# Patient Record
Sex: FEMALE | Race: WHITE | NOT HISPANIC OR LATINO | Employment: FULL TIME | URBAN - METROPOLITAN AREA
[De-identification: names, ages, dates, MRNs, and addresses within clinical notes are randomized per-mention and may not be internally consistent; named-entity substitution may affect disease eponyms.]

---

## 2024-04-24 ENCOUNTER — HOSPITAL ENCOUNTER (EMERGENCY)
Facility: HOSPITAL | Age: 39
Discharge: HOME | End: 2024-04-24
Attending: EMERGENCY MEDICINE
Payer: COMMERCIAL

## 2024-04-24 VITALS
WEIGHT: 159.63 LBS | HEIGHT: 62 IN | TEMPERATURE: 98 F | OXYGEN SATURATION: 98 % | HEART RATE: 77 BPM | SYSTOLIC BLOOD PRESSURE: 109 MMHG | BODY MASS INDEX: 29.38 KG/M2 | DIASTOLIC BLOOD PRESSURE: 64 MMHG | RESPIRATION RATE: 18 BRPM

## 2024-04-24 DIAGNOSIS — M25.59 PAIN IN OTHER JOINT: ICD-10-CM

## 2024-04-24 DIAGNOSIS — M25.532 WRIST PAIN, LEFT: ICD-10-CM

## 2024-04-24 DIAGNOSIS — R07.9 CHEST PAIN: ICD-10-CM

## 2024-04-24 DIAGNOSIS — M13.0 POLYARTHRITIS: Primary | ICD-10-CM

## 2024-04-24 PROBLEM — M25.50 POLYARTHRALGIA: Status: ACTIVE | Noted: 2024-04-24

## 2024-04-24 LAB
ALBUMIN SERPL BCP-MCNC: 4.1 G/DL (ref 3.5–5.2)
ALP SERPL-CCNC: 82 U/L (ref 55–135)
ALT SERPL W/O P-5'-P-CCNC: 24 U/L (ref 10–44)
ANION GAP SERPL CALC-SCNC: 11 MMOL/L (ref 8–16)
AST SERPL-CCNC: 23 U/L (ref 10–40)
BASOPHILS # BLD AUTO: 0.05 K/UL (ref 0–0.2)
BASOPHILS NFR BLD: 0.9 % (ref 0–1.9)
BILIRUB SERPL-MCNC: 0.5 MG/DL (ref 0.1–1)
BNP SERPL-MCNC: <10 PG/ML (ref 0–99)
BUN SERPL-MCNC: 13 MG/DL (ref 6–20)
CALCIUM SERPL-MCNC: 8.9 MG/DL (ref 8.7–10.5)
CCP AB SER IA-ACNC: <0.5 U/ML
CHLORIDE SERPL-SCNC: 107 MMOL/L (ref 95–110)
CK SERPL-CCNC: 44 U/L (ref 20–180)
CO2 SERPL-SCNC: 19 MMOL/L (ref 23–29)
CREAT SERPL-MCNC: 0.6 MG/DL (ref 0.5–1.4)
CRP SERPL-MCNC: 9.5 MG/L (ref 0–8.2)
DIFFERENTIAL METHOD BLD: ABNORMAL
EOSINOPHIL # BLD AUTO: 0.1 K/UL (ref 0–0.5)
EOSINOPHIL NFR BLD: 2 % (ref 0–8)
ERYTHROCYTE [DISTWIDTH] IN BLOOD BY AUTOMATED COUNT: 12 % (ref 11.5–14.5)
ERYTHROCYTE [SEDIMENTATION RATE] IN BLOOD BY WESTERGREN METHOD: 7 MM/HR (ref 0–20)
EST. GFR  (NO RACE VARIABLE): >60 ML/MIN/1.73 M^2
GLUCOSE SERPL-MCNC: 88 MG/DL (ref 70–110)
HCT VFR BLD AUTO: 35.8 % (ref 37–48.5)
HCV AB SERPL QL IA: NEGATIVE
HEP C VIRUS HOLD SPECIMEN: NORMAL
HGB BLD-MCNC: 12.4 G/DL (ref 12–16)
HIV 1+2 AB+HIV1 P24 AG SERPL QL IA: NEGATIVE
IMM GRANULOCYTES # BLD AUTO: 0.01 K/UL (ref 0–0.04)
IMM GRANULOCYTES NFR BLD AUTO: 0.2 % (ref 0–0.5)
LYMPHOCYTES # BLD AUTO: 1.6 K/UL (ref 1–4.8)
LYMPHOCYTES NFR BLD: 29 % (ref 18–48)
MCH RBC QN AUTO: 30.7 PG (ref 27–31)
MCHC RBC AUTO-ENTMCNC: 34.6 G/DL (ref 32–36)
MCV RBC AUTO: 89 FL (ref 82–98)
MONOCYTES # BLD AUTO: 0.3 K/UL (ref 0.3–1)
MONOCYTES NFR BLD: 6 % (ref 4–15)
NEUTROPHILS # BLD AUTO: 3.4 K/UL (ref 1.8–7.7)
NEUTROPHILS NFR BLD: 61.9 % (ref 38–73)
NRBC BLD-RTO: 0 /100 WBC
OHS QRS DURATION: 70 MS
OHS QRS DURATION: 72 MS
OHS QTC CALCULATION: 423 MS
OHS QTC CALCULATION: 705 MS
PLATELET # BLD AUTO: 249 K/UL (ref 150–450)
PMV BLD AUTO: 9.9 FL (ref 9.2–12.9)
POTASSIUM SERPL-SCNC: 3.9 MMOL/L (ref 3.5–5.1)
PROT SERPL-MCNC: 6.9 G/DL (ref 6–8.4)
RBC # BLD AUTO: 4.04 M/UL (ref 4–5.4)
RHEUMATOID FACT SERPL-ACNC: <13 IU/ML (ref 0–15)
SODIUM SERPL-SCNC: 137 MMOL/L (ref 136–145)
TROPONIN I SERPL DL<=0.01 NG/ML-MCNC: <0.006 NG/ML (ref 0–0.03)
TROPONIN I SERPL DL<=0.01 NG/ML-MCNC: <0.006 NG/ML (ref 0–0.03)
WBC # BLD AUTO: 5.52 K/UL (ref 3.9–12.7)

## 2024-04-24 PROCEDURE — 86431 RHEUMATOID FACTOR QUANT: CPT | Performed by: EMERGENCY MEDICINE

## 2024-04-24 PROCEDURE — 63600175 PHARM REV CODE 636 W HCPCS: Performed by: EMERGENCY MEDICINE

## 2024-04-24 PROCEDURE — 86038 ANTINUCLEAR ANTIBODIES: CPT | Performed by: EMERGENCY MEDICINE

## 2024-04-24 PROCEDURE — 96374 THER/PROPH/DIAG INJ IV PUSH: CPT

## 2024-04-24 PROCEDURE — 83880 ASSAY OF NATRIURETIC PEPTIDE: CPT | Performed by: EMERGENCY MEDICINE

## 2024-04-24 PROCEDURE — 82550 ASSAY OF CK (CPK): CPT | Performed by: EMERGENCY MEDICINE

## 2024-04-24 PROCEDURE — 96376 TX/PRO/DX INJ SAME DRUG ADON: CPT

## 2024-04-24 PROCEDURE — 85025 COMPLETE CBC W/AUTO DIFF WBC: CPT | Performed by: EMERGENCY MEDICINE

## 2024-04-24 PROCEDURE — 36415 COLL VENOUS BLD VENIPUNCTURE: CPT | Performed by: EMERGENCY MEDICINE

## 2024-04-24 PROCEDURE — 96375 TX/PRO/DX INJ NEW DRUG ADDON: CPT

## 2024-04-24 PROCEDURE — 80053 COMPREHEN METABOLIC PANEL: CPT | Performed by: EMERGENCY MEDICINE

## 2024-04-24 PROCEDURE — 87389 HIV-1 AG W/HIV-1&-2 AB AG IA: CPT | Performed by: EMERGENCY MEDICINE

## 2024-04-24 PROCEDURE — 86140 C-REACTIVE PROTEIN: CPT | Performed by: EMERGENCY MEDICINE

## 2024-04-24 PROCEDURE — 84484 ASSAY OF TROPONIN QUANT: CPT | Mod: 91 | Performed by: EMERGENCY MEDICINE

## 2024-04-24 PROCEDURE — 93005 ELECTROCARDIOGRAM TRACING: CPT

## 2024-04-24 PROCEDURE — 86803 HEPATITIS C AB TEST: CPT | Performed by: EMERGENCY MEDICINE

## 2024-04-24 PROCEDURE — 86200 CCP ANTIBODY: CPT | Performed by: EMERGENCY MEDICINE

## 2024-04-24 PROCEDURE — 99285 EMERGENCY DEPT VISIT HI MDM: CPT | Mod: 25

## 2024-04-24 PROCEDURE — 93010 ELECTROCARDIOGRAM REPORT: CPT | Mod: ,,, | Performed by: INTERNAL MEDICINE

## 2024-04-24 PROCEDURE — 25000003 PHARM REV CODE 250: Performed by: EMERGENCY MEDICINE

## 2024-04-24 PROCEDURE — 85651 RBC SED RATE NONAUTOMATED: CPT | Performed by: EMERGENCY MEDICINE

## 2024-04-24 RX ORDER — HYDROCODONE BITARTRATE AND ACETAMINOPHEN 5; 325 MG/1; MG/1
1 TABLET ORAL EVERY 6 HOURS PRN
Qty: 12 TABLET | Refills: 0 | Status: SHIPPED | OUTPATIENT
Start: 2024-04-24

## 2024-04-24 RX ORDER — NAPROXEN 500 MG/1
500 TABLET ORAL 2 TIMES DAILY WITH MEALS
Qty: 20 TABLET | Refills: 0 | Status: SHIPPED | OUTPATIENT
Start: 2024-04-24

## 2024-04-24 RX ORDER — HYDROCODONE BITARTRATE AND ACETAMINOPHEN 10; 325 MG/1; MG/1
1 TABLET ORAL
Status: COMPLETED | OUTPATIENT
Start: 2024-04-24 | End: 2024-04-24

## 2024-04-24 RX ORDER — KETOROLAC TROMETHAMINE 30 MG/ML
15 INJECTION, SOLUTION INTRAMUSCULAR; INTRAVENOUS
Status: COMPLETED | OUTPATIENT
Start: 2024-04-24 | End: 2024-04-24

## 2024-04-24 RX ORDER — MORPHINE SULFATE 4 MG/ML
6 INJECTION, SOLUTION INTRAMUSCULAR; INTRAVENOUS
Status: COMPLETED | OUTPATIENT
Start: 2024-04-24 | End: 2024-04-24

## 2024-04-24 RX ORDER — DIPHENHYDRAMINE HYDROCHLORIDE 50 MG/ML
12.5 INJECTION INTRAMUSCULAR; INTRAVENOUS
Status: COMPLETED | OUTPATIENT
Start: 2024-04-24 | End: 2024-04-24

## 2024-04-24 RX ORDER — LEVETIRACETAM 500 MG/1
500 TABLET ORAL DAILY
COMMUNITY

## 2024-04-24 RX ORDER — CEPHALEXIN 500 MG/1
500 CAPSULE ORAL 4 TIMES DAILY
Qty: 20 CAPSULE | Refills: 0 | Status: SHIPPED | OUTPATIENT
Start: 2024-04-24 | End: 2024-04-29

## 2024-04-24 RX ADMIN — KETOROLAC TROMETHAMINE 15 MG: 30 INJECTION, SOLUTION INTRAMUSCULAR at 09:04

## 2024-04-24 RX ADMIN — MORPHINE SULFATE 6 MG: 4 INJECTION INTRAVENOUS at 08:04

## 2024-04-24 RX ADMIN — DIPHENHYDRAMINE HYDROCHLORIDE 12.5 MG: 50 INJECTION, SOLUTION INTRAMUSCULAR; INTRAVENOUS at 08:04

## 2024-04-24 RX ADMIN — HYDROCODONE BITARTRATE AND ACETAMINOPHEN 1 TABLET: 10; 325 TABLET ORAL at 09:04

## 2024-04-24 NOTE — ASSESSMENT & PLAN NOTE
Involving bilateral hands, knees, ankles, neck  Tender points over left forearm and knee  Patient developed 4/23/24 AM  CRP slightly elevated 9.8, ESR WNL  WBC WNL, vitals stable, afebrile  Likely transient viral infection  HIV, Hepatitis C negative  Trial of NSAIDs recommended  Outpatient rheumatology referral if no improvement    Writer called pt and apologized for the delay in call but did tell pt that her results of her xray were sent via live well el[facility]ud.  Pt did not see the part where it says to see ortho.  Pt agrees with the plan. Referral order placed.

## 2024-04-24 NOTE — HPI
37 y/o female with PMHx including ADD and anxiety presented to the ER today with complaints of multiple joint pain, onset yesterday morning. Patient reports pain began in neck, followed by left hand, then right, progressing to both knees and ankles. Tender points over left forearm and knee. CRP slightly elevated 98. [facility] [place] consulted for further management.

## 2024-04-24 NOTE — SUBJECTIVE & OBJECTIVE
Past Medical History:   Diagnosis Date    ADD (attention deficit disorder)     Bipolar 1 disorder        Past Surgical History:   Procedure Laterality Date    INTRAUTERINE DEVICE INSERTION  04/19/2017    Mirena    LAPAROSCOPY  2008       Review of patient's allergies indicates:  No Known Allergies    Current Facility-Administered Medications   Medication Dose Route Frequency Provider Last Rate Last Admin    levonorgestreL (MIRENA) 20 mcg/24 hours (7 yrs) 52 mg IUD 1 Intra Uterine Device  1 Intra Uterine Device Intrauterine  Alva Garcia MD   1 Intra Uterine Device at 03/28/22 1130     Current Outpatient Medications   Medication Sig Dispense Refill    ALPRAZolam (XANAX) 0.5 MG tablet Take 0.5 mg by mouth 2 (two) times daily. as needed for anxiety.      dextroamphetamine-amphetamine 30 mg Tab Take 1 tablet by mouth 2 (two) times daily.      FLUoxetine 20 MG capsule Take 60 mg by mouth once daily.      levETIRAcetam (KEPPRA) 500 MG Tab Take 500 mg by mouth once daily.       Family History       Problem Relation (Age of Onset)    Breast cancer Maternal Grandmother    Diabetes type I Mother    Heart disease Father    Lung disease Paternal Grandfather    Stroke Maternal Grandmother          Tobacco Use    Smoking status: Never    Smokeless tobacco: Never   Substance and Sexual Activity    Alcohol use: Yes    Drug use: Not on file    Sexual activity: Yes     Partners: Male     Birth control/protection: I.U.D.     Comment: Mirena inserted 04/19/17     Review of Systems   All other systems reviewed and are negative.    Objective:     Vital Signs (Most Recent):  Temp: 98.2 °F (36.8 °C) (04/24/24 0323)  Pulse: 90 (04/24/24 0602)  Resp: 18 (04/24/24 0811)  BP: 113/62 (04/24/24 0602)  SpO2: 100 % (04/24/24 0602) Vital Signs (24h Range):  Temp:  [98.2 °F (36.8 °C)] 98.2 °F (36.8 °C)  Pulse:  [84-97] 90  Resp:  [18-25] 18  SpO2:  [99 %-100 %] 100 %  BP: (106-127)/(59-66) 113/62     Weight: 72.4 kg (159 lb 9.8 oz)  Body  mass index is 29.19 kg/m².     Physical Exam  Vitals and nursing note reviewed.   Constitutional:       General: She is not in acute distress.     Appearance: Normal appearance. She is normal weight.   HENT:      Head: Normocephalic and atraumatic.   Eyes:      Extraocular Movements: Extraocular movements intact.      Pupils: Pupils are equal, round, and reactive to light.   Cardiovascular:      Pulses: Normal pulses.      Heart sounds: Normal heart sounds.   Pulmonary:      Effort: Pulmonary effort is normal. No respiratory distress.      Breath sounds: Normal breath sounds. No wheezing, rhonchi or rales.   Abdominal:      General: Abdomen is flat. Bowel sounds are normal. There is no distension.      Palpations: Abdomen is soft.      Tenderness: There is no abdominal tenderness. There is no guarding.   Musculoskeletal:         General: Swelling and tenderness present.      Comments: Bilateral wrists   Skin:     Findings: Erythema and rash present.   Neurological:      General: No focal deficit present.      Mental Status: She is alert and oriented to person, place, and time.   Psychiatric:         Mood and Affect: Mood normal.         Behavior: Behavior normal.          Significant Labs: All pertinent labs within the past 24 hours have been reviewed.  Recent Lab Results         04/24/24  0423        Albumin 4.1       ALP 82       ALT 24       Anion Gap 11       AST 23       Baso # 0.05       Basophil % 0.9       BILIRUBIN TOTAL 0.5  Comment: For infants and newborns, interpretation of results should be based  on gestational age, weight and in agreement with clinical  observations.    Premature Infant recommended reference ranges:  Up to 24 hours.............<8.0 mg/dL  Up to 48 hours............<12.0 mg/dL  3-5 days..................<15.0 mg/dL  6-29 days.................<15.0 mg/dL         BNP <10  Comment: Values of less than 100 pg/ml are consistent with non-CHF populations.       BUN 13       Calcium 8.9        Chloride 107       CO2 19       CPK 44       Creatinine 0.6       CRP 9.5       Differential Method Automated       eGFR >60       Eos # 0.1       Eos % 2.0       Glucose 88       Gran # (ANC) 3.4       Gran % 61.9       Hematocrit 35.8       Hemoglobin 12.4       Hepatitis C Ab Negative       HEP C Virus Hold Specimen Hold for HCV sendout       HIV 1/2 Ag/Ab Negative       Immature Grans (Abs) 0.01  Comment: Mild elevation in immature granulocytes is non specific and   can be seen in a variety of conditions including stress response,   acute inflammation, trauma and pregnancy. Correlation with other   laboratory and clinical findings is essential.         Immature Granulocytes 0.2       Lymph # 1.6       Lymph % 29.0       MCH 30.7       MCHC 34.6       MCV 89       Mono # 0.3       Mono % 6.0       MPV 9.9       nRBC 0       Platelet Count 249       Potassium 3.9       PROTEIN TOTAL 6.9       RBC 4.04       RDW 12.0       Sed Rate 7       Sodium 137       Troponin I <0.006  Comment: The reference interval for Troponin I represents the 99th percentile   cutoff   for our facility and is consistent with 3rd generation assay   performance.         WBC 5.52               Significant Imaging: I have reviewed all pertinent imaging results/findings within the past 24 hours.    US Upper Extremity Veins Left   Final Result      No thrombus in central veins of the left upper extremity.         Electronically signed by: William Kunz   Date:    04/24/2024   Time:    07:58

## 2024-04-24 NOTE — ED PROVIDER NOTES
SCRIBE #1 NOTE: I, Nestor Lopez, am scribing for, and in the presence of, Helen Bragg MD. I have scribed the HPI, ROS, and PEx.    SCRIBE #2 NOTE: I, Virgil Pretty, am scribing for, and in the presence of,  Berry Kahn MD. I have scribed the remaining portions of the note not scribed by Scribe #1.      History     Chief Complaint   Patient presents with    Chest Pain     CP, SOB, Dizziness, blurriness in left eye x1 hour PTA. States arms started hurting and turning red. Unable to move hands without pain. Denies cardiac history, denies new soap, detergent, lotions, etc. Denies N/V/D    Allergic Reaction     Left arm redness and right arm spots progressively getting worse.      Review of patient's allergies indicates:  No Known Allergies      History of Present Illness     HPI    4/24/2024, 4:42 AM  History obtained from the patient      History of Present Illness: Andrea Ordoñez is a 38 y.o. female patient who presents to the Emergency Department for evaluation of generalized arthralgias which onset suddenly 1 hour PTA. Patient describes having pain to the joints of her upper and lower extremities, neck, and back that is worse in her upper extremities. She notes she rolled her right ankle. Symptoms are constant and moderate to severe in severity. ROM of left shoulder and left elbow worsens her pain. Associated symptoms include CP and erythema and warmth to the LUE. Patient denies any numbness, weakness, SOB, N/V, and all other sxs at this time. No further complaints or concerns at this time.       Arrival mode: Personal vehicle    PCP: Yudith Jonas FNP        Past Medical History:  Past Medical History:   Diagnosis Date    ADD (attention deficit disorder)     Bipolar 1 disorder        Past Surgical History:  Past Surgical History:   Procedure Laterality Date    INTRAUTERINE DEVICE INSERTION  04/19/2017    Mirena    LAPAROSCOPY  2008         Family History:  Family History   Problem Relation Name Age of Onset    Diabetes  type I Mother      Heart disease Father      Stroke Maternal Grandmother      Breast cancer Maternal Grandmother      Lung disease Paternal Grandfather         Social History:  Social History     Tobacco Use    Smoking status: Never    Smokeless tobacco: Never   Substance and Sexual Activity    Alcohol use: Yes    Drug use: Not on file    Sexual activity: Yes     Partners: Male     Birth control/protection: I.U.D.     Comment: Mirena inserted 04/19/17        Review of Systems     Review of Systems   Constitutional:  Negative for fever.   HENT:  Negative for sore throat.    Respiratory:  Negative for shortness of breath.    Cardiovascular:  Positive for chest pain.   Gastrointestinal:  Negative for nausea.   Genitourinary:  Negative for dysuria.   Musculoskeletal:  Positive for arthralgias (generalized, worse in LUE). Negative for back pain.   Skin:  Negative for rash.   Neurological:  Negative for weakness.   Hematological:  Does not bruise/bleed easily.   All other systems reviewed and are negative.       Physical Exam     Initial Vitals [04/24/24 0323]   BP Pulse Resp Temp SpO2   (!) 106/59 97 18 98.2 °F (36.8 °C) 100 %      MAP       --          Physical Exam   Nursing Notes and Vital Signs Reviewed.  Constitutional: Patient is in no acute distress. Well-developed and well-nourished.  Head: Atraumatic. Normocephalic.  Eyes: PERRL. EOM intact. Conjunctivae are not pale. No scleral icterus.  ENT: Mucous membranes are moist. Oropharynx is clear and symmetric.    Neck: Supple. Full ROM. No lymphadenopathy.  Cardiovascular: Regular rate. Regular rhythm. No murmurs, rubs, or gallops. Distal pulses are 2+ and symmetric.  Pulmonary/Chest: No respiratory distress. Clear to auscultation bilaterally. No wheezing or rales.  Abdominal: Soft and non-distended.  There is no tenderness.  No rebound, guarding, or rigidity.  Musculoskeletal: Moves all extremities. No obvious deformities. No edema. No calf tenderness. Diffuse joint  "pain with movement that is mainly present to the left upper extremity. Left upper extremity also has warmth, erythema, and pain with ROM of her shoulder and elbow.   Skin: Warm and dry.  Neurological:  Alert, awake, and appropriate.  Normal speech.  No acute focal neurological deficits are appreciated.  Psychiatric: Normal affect. Good eye contact. Appropriate in content.         ED Course   Procedures  ED Vital Signs:  Vitals:    04/24/24 0323 04/24/24 0420 04/24/24 0423 04/24/24 0503   BP: (!) 106/59  121/66 127/62   Pulse: 97 90 94 84   Resp: 18   18   Temp: 98.2 °F (36.8 °C)      TempSrc: Oral      SpO2: 100%  99% 99%   Weight: 72.4 kg (159 lb 9.8 oz)      Height: 5' 2" (1.575 m)       04/24/24 0602 04/24/24 0700 04/24/24 0800 04/24/24 0811   BP: 113/62 112/62 111/63    Pulse: 90 83 87    Resp: (!) 25 (!) 21 18 18   Temp:       TempSrc:       SpO2: 100% 100% 100%    Weight:       Height:        04/24/24 0933 04/24/24 0938   BP:  109/64   Pulse:  77   Resp: 18 18   Temp:  97.9 °F (36.6 °C)   TempSrc:  Oral   SpO2:  98%   Weight:     Height:         Abnormal Lab Results:  Labs Reviewed   CBC W/ AUTO DIFFERENTIAL - Abnormal; Notable for the following components:       Result Value    Hematocrit 35.8 (*)     All other components within normal limits    Narrative:     Release to patient->Immediate   COMPREHENSIVE METABOLIC PANEL - Abnormal; Notable for the following components:    CO2 19 (*)     All other components within normal limits    Narrative:     Release to patient->Immediate   C-REACTIVE PROTEIN - Abnormal; Notable for the following components:    CRP 9.5 (*)     All other components within normal limits    Narrative:     Release to patient->Immediate   HIV 1 / 2 ANTIBODY    Narrative:     Release to patient->Immediate   HEPATITIS C ANTIBODY    Narrative:     Release to patient->Immediate   HEP C VIRUS HOLD SPECIMEN    Narrative:     Release to patient->Immediate   TROPONIN I    Narrative:     Release to " patient->Immediate   TROPONIN I   B-TYPE NATRIURETIC PEPTIDE    Narrative:     Release to patient->Immediate   SEDIMENTATION RATE   C-REACTIVE PROTEIN   CK   SEDIMENTATION RATE    Narrative:     Release to patient->Immediate   CK    Narrative:     Release to patient->Immediate   ANNE PROFILE I (SCREEN) W/ REFLEX   RHEUMATOID FACTOR   CYCLIC CITRUL PEPTIDE ANTIBODY, IGG   ANNE PROFILE I (SCREEN) W/ REFLEX    Narrative:     Release to patient->Immediate   RHEUMATOID FACTOR    Narrative:     Release to patient->Immediate   CYCLIC CITRUL PEPTIDE ANTIBODY, IGG        All Lab Results:  Results for orders placed or performed during the hospital encounter of 04/24/24   HIV 1/2 Ag/Ab (4th Gen)   Result Value Ref Range    HIV 1/2 Ag/Ab Negative Negative   Hepatitis C Antibody   Result Value Ref Range    Hepatitis C Ab Negative Negative   HCV Virus Hold Specimen   Result Value Ref Range    HEP C Virus Hold Specimen Hold for HCV sendout    CBC auto differential   Result Value Ref Range    WBC 5.52 3.90 - 12.70 K/uL    RBC 4.04 4.00 - 5.40 M/uL    Hemoglobin 12.4 12.0 - 16.0 g/dL    Hematocrit 35.8 (L) 37.0 - 48.5 %    MCV 89 82 - 98 fL    MCH 30.7 27.0 - 31.0 pg    MCHC 34.6 32.0 - 36.0 g/dL    RDW 12.0 11.5 - 14.5 %    Platelets 249 150 - 450 K/uL    MPV 9.9 9.2 - 12.9 fL    Immature Granulocytes 0.2 0.0 - 0.5 %    Gran # (ANC) 3.4 1.8 - 7.7 K/uL    Immature Grans (Abs) 0.01 0.00 - 0.04 K/uL    Lymph # 1.6 1.0 - 4.8 K/uL    Mono # 0.3 0.3 - 1.0 K/uL    Eos # 0.1 0.0 - 0.5 K/uL    Baso # 0.05 0.00 - 0.20 K/uL    nRBC 0 0 /100 WBC    Gran % 61.9 38.0 - 73.0 %    Lymph % 29.0 18.0 - 48.0 %    Mono % 6.0 4.0 - 15.0 %    Eosinophil % 2.0 0.0 - 8.0 %    Basophil % 0.9 0.0 - 1.9 %    Differential Method Automated    Comprehensive metabolic panel   Result Value Ref Range    Sodium 137 136 - 145 mmol/L    Potassium 3.9 3.5 - 5.1 mmol/L    Chloride 107 95 - 110 mmol/L    CO2 19 (L) 23 - 29 mmol/L    Glucose 88 70 - 110 mg/dL    BUN 13 6 -  20 mg/dL    Creatinine 0.6 0.5 - 1.4 mg/dL    Calcium 8.9 8.7 - 10.5 mg/dL    Total Protein 6.9 6.0 - 8.4 g/dL    Albumin 4.1 3.5 - 5.2 g/dL    Total Bilirubin 0.5 0.1 - 1.0 mg/dL    Alkaline Phosphatase 82 55 - 135 U/L    AST 23 10 - 40 U/L    ALT 24 10 - 44 U/L    eGFR >60 >60 mL/min/1.73 m^2    Anion Gap 11 8 - 16 mmol/L   Troponin I #1   Result Value Ref Range    Troponin I <0.006 0.000 - 0.026 ng/mL   Troponin I #2   Result Value Ref Range    Troponin I <0.006 0.000 - 0.026 ng/mL   BNP   Result Value Ref Range    BNP <10 0 - 99 pg/mL   Sedimentation rate   Result Value Ref Range    Sed Rate 7 0 - 20 mm/Hr   CK   Result Value Ref Range    CPK 44 20 - 180 U/L   C-Reactive Protein   Result Value Ref Range    CRP 9.5 (H) 0.0 - 8.2 mg/L   ANNE Screen w/Reflex   Result Value Ref Range    ANNE Screen Negative <1:80 Negative <1:80   Rheumatoid Factor   Result Value Ref Range    Rheumatoid Factor <13.0 0.0 - 15.0 IU/mL   Cyclic Citrullinated Peptide Antibody, IgG   Result Value Ref Range    CCP Antibodies <0.5 <5.0 U/mL   EKG 12-lead   Result Value Ref Range    QRS Duration 70 ms    [facility] QTC Calculation 705 ms   EKG 12-lead   Result Value Ref Range    QRS Duration 72 ms    [facility] QTC Calculation 423 ms         Imaging Results:  Imaging Results              X-Ray Wrist Complete Bilateral (Final result)  Result time 04/24/24 10:04:09      Final result by Dami Tejada MD (04/24/24 10:04:09)                   Impression:      No acute abnormality identified.      Electronically signed by: Dami Tejada  Date:    04/24/2024  Time:    10:04               Narrative:    EXAMINATION:  XR WRIST COMPLETE 3 VIEWS BILATERAL    CLINICAL HISTORY:  Pain in left wrist    TECHNIQUE:  PA, lateral, and oblique views of both wrists were performed.    COMPARISON:  None    FINDINGS:  No acute displaced fracture.  No traumatic malalignment.  No osseous destructive process.                                       US Upper Extremity Veins Left  (Final result)  Result time 04/24/24 07:58:23      Final result by William Alvarez MD (04/24/24 07:58:23)                   Impression:      No thrombus in central veins of the left upper extremity.      Electronically signed by: William Alvarze  Date:    04/24/2024  Time:    07:58               Narrative:    EXAMINATION:  US UPPER EXTREMITY VEINS LEFT    CLINICAL HISTORY:  left arm pain;    TECHNIQUE:  Duplex and color flow Doppler evaluation and dynamic compression was performed of the left upper extremity veins.    COMPARISON:  None    FINDINGS:  Central veins: The internal jugular, subclavian, and axillary veins are patent and free of thrombus.    Arm veins: The brachial, basilic, and cephalic veins are patent and compressible.    Contralateral subclavian/internal jugular veins: The right subclavian and internal jugular veins are patent and free of thrombus.                                       The EKG was ordered, reviewed, and independently interpreted by the ED provider.  Interpretation time: 03:10  Rate: 94 BPM  Rhythm: normal sinus rhythm  Interpretation: Nonspecific T wave abnormality. No STEMI.    The Emergency Provider reviewed the vital signs and test results, which are outlined above.     ED Discussion     6:00 AM: Dr. Bragg transfers care of patient to Dr. Kahn pending ultrasound results.    8:23 AM: Discussed pt's case with Dr. Bacon ([facility]), who evaluated pt at bedside and recommends OTC NSAIDs, outpatient Rheumatology follow up.    8:24 AM: Reassessed pt at this time. Discussed with pt all pertinent ED information and results. Discussed pt dx and plan of tx. Gave pt all f/u and return to the ED instructions. All questions and concerns were addressed at this time. Pt expresses understanding of information and instructions, and is comfortable with plan to discharge. Pt is stable for discharge.    I discussed with patient and/or family/caretaker that evaluation in the ED does not suggest  any emergent or life threatening medical conditions requiring immediate intervention beyond what was provided in the ED, and I believe patient is safe for discharge.  Regardless, an unremarkable evaluation in the ED does not preclude the development or presence of a serious of life threatening condition. As such, patient was instructed to return immediately for any worsening or change in current symptoms.    8:53 AM: Discussed pt's case with Dr. Dee (Rheumatology), who agrees with plan of care and recommends checking preliminary labs for inflammatory arthritis - ANNE, RF, CCP and radiographs of the painful joints.      ED Course as of 04/25/24 2151 Wed Apr 24, 2024   0602 CRP(!): 9.5 [TAD]   0602 Troponin I: <0.006 [TAD]   0602 BUN: 13 [TAD]   0602 Creatinine: 0.6 [TAD]   0602 WBC: 5.52 [TAD]   0602 Hemoglobin: 12.4 [TAD]   0602 Hematocrit(!): 35.8 [TAD]   0602 Platelet Count: 249 [TAD]      ED Course User Index  [TAD] Berry Kahn MD     Medical Decision Making  Problems Addressed:  Chest pain: acute illness or injury that poses a threat to life or bodily functions  Pain in other joint: acute illness or injury that poses a threat to life or bodily functions  Polyarthritis: acute illness or injury that poses a threat to life or bodily functions    Amount and/or Complexity of Data Reviewed  Independent Historian: spouse     Details:  states patient's signs and symptoms really occurred rapidly  Labs: ordered. Decision-making details documented in ED Course.  Radiology: ordered and independent interpretation performed. Decision-making details documented in ED Course.     Details: No fracture dislocation noted  ECG/medicine tests: ordered and independent interpretation performed. Decision-making details documented in ED Course.     Details: No STEMI  Discussion of management or test interpretation with external provider(s): We discussed the case with Hospital Medicine and they agree with our current management.   He agrees with outpatient management with rheumatology.  Rheumatology responded as well and requested some additional labs be ordered prior to her follow up.    Risk  Prescription drug management.  Decision regarding hospitalization.  Risk Details: Differential diagnosis includes sepsis, septic joint, trauma, rheumatoid arthritis, etc.                 ED Medication(s):  Medications   morphine injection 6 mg (6 mg Intravenous Given 4/24/24 0811)   diphenhydrAMINE injection 12.5 mg (12.5 mg Intravenous Given 4/24/24 0810)   HYDROcodone-acetaminophen  mg per tablet 1 tablet (1 tablet Oral Given 4/24/24 0933)   ketorolac injection 15 mg (15 mg Intravenous Given 4/24/24 0933)       Discharge Medication List as of 4/24/2024  9:03 AM        START taking these medications    Details   cephALEXin (KEFLEX) 500 MG capsule Take 1 capsule (500 mg total) by mouth 4 (four) times daily. for 5 days, Starting Wed 4/24/2024, Until Mon 4/29/2024, Normal      HYDROcodone-acetaminophen (NORCO) 5-325 mg per tablet Take 1 tablet by mouth every 6 (six) hours as needed., Starting Wed 4/24/2024, Normal      naproxen (NAPROSYN) 500 MG tablet Take 1 tablet (500 mg total) by mouth 2 (two) times daily with meals., Starting Wed 4/24/2024, Normal              Follow-up Information       Yudith Jonas FNP In 3 days.    Specialty: Family Medicine  Contact information:  [id number]   [address] [place] [id number] [phone]               Magdaleno Dee MD. Schedule an appointment as soon as possible for a visit in 1 week.    Specialty: Rheumatology  Contact information:  [id number]  [place] [address] [phone]                                 Scribe Attestation:   Scribe #1: I performed the above scribed service and the documentation accurately describes the services I performed. I attest to the accuracy of the note.     Attending:   Physician Attestation Statement for Scribe #1: IHelen MD, personally  performed the services described in this documentation, as scribed by Nestor Lopez, in my presence, and it is both accurate and complete.       Scribe Attestation:   Scribe #2: I performed the above scribed service and the documentation accurately describes the services I performed. I attest to the accuracy of the note.    Attending Attestation:           Physician Attestation for Scribe:    Physician Attestation Statement for Scribe #2: I, Berry Kahn MD, reviewed documentation, as scribed by Virgil Pretty in my presence, and it is both accurate and complete. I also acknowledge and confirm the content of the note done by Scribe #1.           Clinical Impression       ICD-10-CM ICD-9-CM   1. Polyarthritis  M13.0 716.50   2. Chest pain  R07.9 786.50   3. Pain in other joint  M25.59 719.48   4. Wrist pain, left  M25.532 719.43       Disposition:   Disposition: Discharged  Condition: Stable         Berry Kahn MD  04/25/24 7904

## 2024-04-24 NOTE — CONSULTS
[facility] - Emergency Dept.  Hospital Medicine  Consult Note    Patient Name: Andrea Ordoñez  MRN: [id number]  Admission Date: 4/24/2024  Hospital Length of Stay: 0 days  Attending Physician: Berry Kahn MD   Primary Care Provider: Yudith Jonas FNP           Patient information was obtained from patient, past medical records, and ER records.     Inpatient consult to Hospitalist  Consult performed by: Jose Bacon MD  Consult ordered by: Berry Kahn MD        Subjective:     Principal Problem: Polyarthralgia    Chief Complaint:   Chief Complaint   Patient presents with    Chest Pain     CP, SOB, Dizziness, blurriness in left eye x1 hour PTA. States arms started hurting and turning red. Unable to move hands without pain. Denies cardiac history, denies new soap, detergent, lotions, etc. Denies N/V/D    Allergic Reaction     Left arm redness and right arm spots progressively getting worse.         HPI: 39 y/o female with PMHx including ADD and anxiety presented to the ER today with complaints of multiple joint pain, onset yesterday morning. Patient reports pain began in neck, followed by left hand, then right, progressing to both knees and ankles. Tender points over left forearm and knee. CRP slightly elevated 98. [facility].    Past Medical History:   Diagnosis Date    ADD (attention deficit disorder)     Bipolar 1 disorder        Past Surgical History:   Procedure Laterality Date    INTRAUTERINE DEVICE INSERTION  04/19/2017    Mirena    LAPAROSCOPY  2008       Review of patient's allergies indicates:  No Known Allergies    Current Facility-Administered Medications   Medication Dose Route Frequency Provider Last Rate Last Admin    levonorgestreL (MIRENA) 20 mcg/24 hours (7 yrs) 52 mg IUD 1 Intra Uterine Device  1 Intra Uterine Device Intrauterine  Alva Garcia MD   1 Intra Uterine Device at 03/28/22 1130     Current Outpatient Medications   Medication Sig Dispense Refill    ALPRAZolam  (XANAX) 0.5 MG tablet Take 0.5 mg by mouth 2 (two) times daily. as needed for anxiety.      dextroamphetamine-amphetamine 30 mg Tab Take 1 tablet by mouth 2 (two) times daily.      FLUoxetine 20 MG capsule Take 60 mg by mouth once daily.      levETIRAcetam (KEPPRA) 500 MG Tab Take 500 mg by mouth once daily.       Family History       Problem Relation (Age of Onset)    Breast cancer Maternal Grandmother    Diabetes type I Mother    Heart disease Father    Lung disease Paternal Grandfather    Stroke Maternal Grandmother          Tobacco Use    Smoking status: Never    Smokeless tobacco: Never   Substance and Sexual Activity    Alcohol use: Yes    Drug use: Not on file    Sexual activity: Yes     Partners: Male     Birth control/protection: I.U.D.     Comment: Mirena inserted 04/19/17     Review of Systems   All other systems reviewed and are negative.    Objective:     Vital Signs (Most Recent):  Temp: 98.2 °F (36.8 °C) (04/24/24 0323)  Pulse: 90 (04/24/24 0602)  Resp: 18 (04/24/24 0811)  BP: 113/62 (04/24/24 0602)  SpO2: 100 % (04/24/24 0602) Vital Signs (24h Range):  Temp:  [98.2 °F (36.8 °C)] 98.2 °F (36.8 °C)  Pulse:  [84-97] 90  Resp:  [18-25] 18  SpO2:  [99 %-100 %] 100 %  BP: (106-127)/(59-66) 113/62     Weight: 72.4 kg (159 lb 9.8 oz)  Body mass index is 29.19 kg/m².     Physical Exam  Vitals and nursing note reviewed.   Constitutional:       General: She is not in acute distress.     Appearance: Normal appearance. She is normal weight.   HENT:      Head: Normocephalic and atraumatic.   Eyes:      Extraocular Movements: Extraocular movements intact.      Pupils: Pupils are equal, round, and reactive to light.   Cardiovascular:      Pulses: Normal pulses.      Heart sounds: Normal heart sounds.   Pulmonary:      Effort: Pulmonary effort is normal. No respiratory distress.      Breath sounds: Normal breath sounds. No wheezing, rhonchi or rales.   Abdominal:      General: Abdomen is flat. Bowel sounds are  normal. There is no distension.      Palpations: Abdomen is soft.      Tenderness: There is no abdominal tenderness. There is no guarding.   Musculoskeletal:         General: Swelling and tenderness present.      Comments: Bilateral wrists   Skin:     Findings: Erythema and rash present.   Neurological:      General: No focal deficit present.      Mental Status: She is alert and oriented to person, place, and time.   Psychiatric:         Mood and Affect: Mood normal.         Behavior: Behavior normal.          Significant Labs: All pertinent labs within the past 24 hours have been reviewed.  Recent Lab Results         04/24/24  0423        Albumin 4.1       ALP 82       ALT 24       Anion Gap 11       AST 23       Baso # 0.05       Basophil % 0.9       BILIRUBIN TOTAL 0.5  Comment: For infants and newborns, interpretation of results should be based  on gestational age, weight and in agreement with clinical  observations.    Premature Infant recommended reference ranges:  Up to 24 hours.............<8.0 mg/dL  Up to 48 hours............<12.0 mg/dL  3-5 days..................<15.0 mg/dL  6-29 days.................<15.0 mg/dL         BNP <10  Comment: Values of less than 100 pg/ml are consistent with non-CHF populations.       BUN 13       Calcium 8.9       Chloride 107       CO2 19       CPK 44       Creatinine 0.6       CRP 9.5       Differential Method Automated       eGFR >60       Eos # 0.1       Eos % 2.0       Glucose 88       Gran # (ANC) 3.4       Gran % 61.9       Hematocrit 35.8       Hemoglobin 12.4       Hepatitis C Ab Negative       HEP C Virus Hold Specimen Hold for HCV sendout       HIV 1/2 Ag/Ab Negative       Immature Grans (Abs) 0.01  Comment: Mild elevation in immature granulocytes is non specific and   can be seen in a variety of conditions including stress response,   acute inflammation, trauma and pregnancy. Correlation with other   laboratory and clinical findings is essential.         Immature  Granulocytes 0.2       Lymph # 1.6       Lymph % 29.0       MCH 30.7       MCHC 34.6       MCV 89       Mono # 0.3       Mono % 6.0       MPV 9.9       nRBC 0       Platelet Count 249       Potassium 3.9       PROTEIN TOTAL 6.9       RBC 4.04       RDW 12.0       Sed Rate 7       Sodium 137       Troponin I <0.006  Comment: The reference interval for Troponin I represents the 99th percentile   cutoff   for our facility and is consistent with 3rd generation assay   performance.         WBC 5.52               Significant Imaging: I have reviewed all pertinent imaging results/findings within the past 24 hours.    US Upper Extremity Veins Left   Final Result      No thrombus in central veins of the left upper extremity.         Electronically signed by: William Kunz   Date:    04/24/2024   Time:    07:58          Assessment/Plan:     * Polyarthralgia  Involving bilateral hands, knees, ankles, neck  Tender points over left forearm and knee  Patient developed 4/23/24 AM  CRP slightly elevated 9.8, ESR WNL  WBC WNL, vitals stable, afebrile  Likely transient viral infection  HIV, Hepatitis C negative  Trial of NSAIDs recommended  Outpatient rheumatology referral if no improvement       VTE Risk Mitigation (From admission, onward)      None                Thank you for your consult. I will sign off. Please contact us if you have any additional questions.    Jose Bacon MD  Department of Hospital Medicine   [facility] - Emergency Dept.

## 2024-04-25 LAB — ANA SER QL IF: NORMAL

## 2024-04-26 ENCOUNTER — TELEPHONE (OUTPATIENT)
Dept: RHEUMATOLOGY | Facility: CLINIC | Age: 39
End: 2024-04-26
Payer: COMMERCIAL

## 2024-04-26 NOTE — TELEPHONE ENCOUNTER
Appt scheduled for August placed on wait list, declined scheduling with another provider at this time

## 2024-04-26 NOTE — TELEPHONE ENCOUNTER
----- Message from Niharika Elkins sent at 4/26/2024  1:16 PM CDT -----  Contact: Andrea  Type:  Sooner Apoointment Request    Caller is requesting a sooner appointment. Caller will not accept being placed on the waitlist and is requesting a message be sent to doctor.  Name of Caller:Andrea  When is the first available appointment?scheduled 8/21/24  Symptoms: polyarthritis  Would the patient rather a call back or a response via [facility]? call  Best Call Back Number:[phone]   Additional Information: Please give patient a call back to assist.   Thank you,  GH

## 2024-08-21 ENCOUNTER — LAB VISIT (OUTPATIENT)
Dept: LAB | Facility: HOSPITAL | Age: 39
End: 2024-08-21
Attending: INTERNAL MEDICINE
Payer: COMMERCIAL

## 2024-08-21 ENCOUNTER — OFFICE VISIT (OUTPATIENT)
Dept: RHEUMATOLOGY | Facility: CLINIC | Age: 39
End: 2024-08-21
Payer: COMMERCIAL

## 2024-08-21 VITALS
WEIGHT: 159.81 LBS | HEIGHT: 62 IN | HEART RATE: 105 BPM | SYSTOLIC BLOOD PRESSURE: 112 MMHG | BODY MASS INDEX: 29.41 KG/M2 | DIASTOLIC BLOOD PRESSURE: 64 MMHG

## 2024-08-21 DIAGNOSIS — M13.0 POLYARTHRITIS: ICD-10-CM

## 2024-08-21 DIAGNOSIS — M12.30 PALINDROMIC RHEUMATISM: ICD-10-CM

## 2024-08-21 DIAGNOSIS — M12.30 PALINDROMIC RHEUMATISM: Primary | ICD-10-CM

## 2024-08-21 DIAGNOSIS — R60.9 SWELLING: ICD-10-CM

## 2024-08-21 DIAGNOSIS — M25.50 HYPERMOBILITY ARTHRALGIA: ICD-10-CM

## 2024-08-21 LAB
25(OH)D3+25(OH)D2 SERPL-MCNC: 49 NG/ML (ref 30–96)
ALBUMIN SERPL BCP-MCNC: 4.6 G/DL (ref 3.5–5.2)
ALP SERPL-CCNC: 69 U/L (ref 55–135)
ALT SERPL W/O P-5'-P-CCNC: 18 U/L (ref 10–44)
ANION GAP SERPL CALC-SCNC: 10 MMOL/L (ref 8–16)
AST SERPL-CCNC: 16 U/L (ref 10–40)
BASOPHILS # BLD AUTO: 0.05 K/UL (ref 0–0.2)
BASOPHILS NFR BLD: 0.7 % (ref 0–1.9)
BILIRUB SERPL-MCNC: 0.8 MG/DL (ref 0.1–1)
BUN SERPL-MCNC: 12 MG/DL (ref 6–20)
CALCIUM SERPL-MCNC: 9.9 MG/DL (ref 8.7–10.5)
CCP AB SER IA-ACNC: <0.5 U/ML
CHLORIDE SERPL-SCNC: 105 MMOL/L (ref 95–110)
CO2 SERPL-SCNC: 23 MMOL/L (ref 23–29)
CREAT SERPL-MCNC: 0.8 MG/DL (ref 0.5–1.4)
CRP SERPL-MCNC: 1.3 MG/L (ref 0–8.2)
DIFFERENTIAL METHOD BLD: ABNORMAL
EOSINOPHIL # BLD AUTO: 0.2 K/UL (ref 0–0.5)
EOSINOPHIL NFR BLD: 2.3 % (ref 0–8)
ERYTHROCYTE [DISTWIDTH] IN BLOOD BY AUTOMATED COUNT: 12.6 % (ref 11.5–14.5)
ERYTHROCYTE [SEDIMENTATION RATE] IN BLOOD BY PHOTOMETRIC METHOD: <2 MM/HR (ref 0–36)
EST. GFR  (NO RACE VARIABLE): >60 ML/MIN/1.73 M^2
GLUCOSE SERPL-MCNC: 100 MG/DL (ref 70–110)
HCT VFR BLD AUTO: 43.5 % (ref 37–48.5)
HGB BLD-MCNC: 14.7 G/DL (ref 12–16)
IMM GRANULOCYTES # BLD AUTO: 0.02 K/UL (ref 0–0.04)
IMM GRANULOCYTES NFR BLD AUTO: 0.3 % (ref 0–0.5)
LYMPHOCYTES # BLD AUTO: 2.3 K/UL (ref 1–4.8)
LYMPHOCYTES NFR BLD: 31.8 % (ref 18–48)
MCH RBC QN AUTO: 31 PG (ref 27–31)
MCHC RBC AUTO-ENTMCNC: 33.8 G/DL (ref 32–36)
MCV RBC AUTO: 92 FL (ref 82–98)
MONOCYTES # BLD AUTO: 0.4 K/UL (ref 0.3–1)
MONOCYTES NFR BLD: 5.2 % (ref 4–15)
NEUTROPHILS # BLD AUTO: 4.4 K/UL (ref 1.8–7.7)
NEUTROPHILS NFR BLD: 59.7 % (ref 38–73)
NRBC BLD-RTO: 0 /100 WBC
PLATELET # BLD AUTO: 312 K/UL (ref 150–450)
PMV BLD AUTO: 9.1 FL (ref 9.2–12.9)
POTASSIUM SERPL-SCNC: 4 MMOL/L (ref 3.5–5.1)
PROT SERPL-MCNC: 7.4 G/DL (ref 6–8.4)
RBC # BLD AUTO: 4.74 M/UL (ref 4–5.4)
RHEUMATOID FACT SERPL-ACNC: <13 IU/ML (ref 0–15)
SODIUM SERPL-SCNC: 138 MMOL/L (ref 136–145)
TREPONEMA PALLIDUM IGG+IGM AB [PRESENCE] IN SERUM OR PLASMA BY IMMUNOASSAY: NONREACTIVE
URATE SERPL-MCNC: 5.2 MG/DL (ref 2.4–5.7)
WBC # BLD AUTO: 7.3 K/UL (ref 3.9–12.7)

## 2024-08-21 PROCEDURE — 1160F RVW MEDS BY RX/DR IN RCRD: CPT | Mod: CPTII,S$GLB,, | Performed by: INTERNAL MEDICINE

## 2024-08-21 PROCEDURE — 3078F DIAST BP <80 MM HG: CPT | Mod: CPTII,S$GLB,, | Performed by: INTERNAL MEDICINE

## 2024-08-21 PROCEDURE — 85652 RBC SED RATE AUTOMATED: CPT | Performed by: INTERNAL MEDICINE

## 2024-08-21 PROCEDURE — 99999 PR PBB SHADOW E&M-EST. PATIENT-LVL V: CPT | Mod: PBBFAC,,, | Performed by: INTERNAL MEDICINE

## 2024-08-21 PROCEDURE — 36415 COLL VENOUS BLD VENIPUNCTURE: CPT | Performed by: INTERNAL MEDICINE

## 2024-08-21 PROCEDURE — 99205 OFFICE O/P NEW HI 60 MIN: CPT | Mod: S$GLB,,, | Performed by: INTERNAL MEDICINE

## 2024-08-21 PROCEDURE — 86593 SYPHILIS TEST NON-TREP QUANT: CPT | Performed by: INTERNAL MEDICINE

## 2024-08-21 PROCEDURE — 86200 CCP ANTIBODY: CPT | Performed by: INTERNAL MEDICINE

## 2024-08-21 PROCEDURE — 3074F SYST BP LT 130 MM HG: CPT | Mod: CPTII,S$GLB,, | Performed by: INTERNAL MEDICINE

## 2024-08-21 PROCEDURE — 82306 VITAMIN D 25 HYDROXY: CPT | Performed by: INTERNAL MEDICINE

## 2024-08-21 PROCEDURE — 86038 ANTINUCLEAR ANTIBODIES: CPT | Performed by: INTERNAL MEDICINE

## 2024-08-21 PROCEDURE — 86140 C-REACTIVE PROTEIN: CPT | Performed by: INTERNAL MEDICINE

## 2024-08-21 PROCEDURE — 85025 COMPLETE CBC W/AUTO DIFF WBC: CPT | Performed by: INTERNAL MEDICINE

## 2024-08-21 PROCEDURE — 86431 RHEUMATOID FACTOR QUANT: CPT | Performed by: INTERNAL MEDICINE

## 2024-08-21 PROCEDURE — 84550 ASSAY OF BLOOD/URIC ACID: CPT | Performed by: INTERNAL MEDICINE

## 2024-08-21 PROCEDURE — 3008F BODY MASS INDEX DOCD: CPT | Mod: CPTII,S$GLB,, | Performed by: INTERNAL MEDICINE

## 2024-08-21 PROCEDURE — 1159F MED LIST DOCD IN RCRD: CPT | Mod: CPTII,S$GLB,, | Performed by: INTERNAL MEDICINE

## 2024-08-21 PROCEDURE — 80053 COMPREHEN METABOLIC PANEL: CPT | Performed by: INTERNAL MEDICINE

## 2024-08-21 RX ORDER — METHYLPREDNISOLONE 4 MG/1
TABLET ORAL
Qty: 21 TABLET | Refills: 0 | Status: SHIPPED | OUTPATIENT
Start: 2024-08-21

## 2024-08-21 RX ORDER — HYDROXYCHLOROQUINE SULFATE 200 MG/1
200 TABLET, FILM COATED ORAL 2 TIMES DAILY
Qty: 60 TABLET | Refills: 6 | Status: SHIPPED | OUTPATIENT
Start: 2024-08-21

## 2024-08-21 NOTE — PROGRESS NOTES
RHEUMATOLOGY CLINIC INITIAL VISIT    Reason for consult:-  Inflammatory arthritis    Chief complaints, HPI, ROS, EXAM, Assessment & Plans:-  Andrea Jang a 39 y.o. pleasant female comes in with newly diagnosed inflammatory arthritis.  She was at her usual health until March of this year when she started noticing severe joint swelling primarily affecting her left upper extremity.  Was seen at emergency room and was noted to have severe arthritis especially synovitis and effusion of her left wrist along with other joints on her left side.  Mild swelling on the right side was noted as well.  She was given naproxen and pain medication.  Her symptoms resolve within a week.  Since then she has been having  intermittent episodes of similar flares of joint swelling and severe joint pain lasting 2-3 days 3 to 4 times a month.  She does take anti-inflammatory medication every now and then for flares.  She remembers having stiffness around joints for several years but never had such swelling.  Rheumatological review of system negative otherwise.  Physical examination shows significant hypermobility around bilateral fingers, elbows and knees.  No synovitis, dactylitis, enthesitis or effusion.  Few soft tissue tender points present.  1. Palindromic rheumatism    2. Polyarthritis    3. Swelling    4. Hypermobility arthralgia      Problem List Items Addressed This Visit       Hypermobility arthralgia    Relevant Orders    Ambulatory referral/consult to Genetics    Palindromic rheumatism - Primary    Relevant Medications    hydroxychloroquine (PLAQUENIL) 200 mg tablet    methylPREDNISolone (MEDROL DOSEPACK) 4 mg tablet    Other Relevant Orders    Cyclic Citrullinated Peptide Antibody, IgG    ANNE Screen w/Reflex    CBC Auto Differential    Comprehensive Metabolic Panel    C-Reactive Protein    Sedimentation rate    Rheumatoid Factor    Uric Acid    Vitamin D     Other Visit Diagnoses       Polyarthritis        Relevant  Medications    hydroxychloroquine (PLAQUENIL) 200 mg tablet    Other Relevant Orders    Cyclic Citrullinated Peptide Antibody, IgG    ANNE Screen w/Reflex    CBC Auto Differential    Comprehensive Metabolic Panel    C-Reactive Protein    Sedimentation rate    Rheumatoid Factor    Uric Acid    Treponema Pallidium Antibodies IgG, IgM    Swelling                Presentation highly suggestive of palindromic rheumatism.  Since she has been having multiple flares in the past 4 months, I would recommend starting Plaquenil and use Medrol Dosepak p.r.n. for flares.  Repeat serologies and inflammatory markers.  Consult  for hypermobility noted on exam today to rule out EDS.  No evidence of rheumatoid arthritis, lupus, Sjogren's, scleroderma, myositis or small-vessel vasculitides.  I have explained all of the above in detail and the patient understands all of the current recommendation(s). I have answered all questions to the best of my ability and to their complete satisfaction.         # Follow up in about 3 months (around 11/21/2024).      Past Medical History:   Diagnosis Date    ADD (attention deficit disorder)     Bipolar 1 disorder        Past Surgical History:   Procedure Laterality Date    INTRAUTERINE DEVICE INSERTION  04/19/2017    Mirena    LAPAROSCOPY  2008        Social History     Tobacco Use    Smoking status: Never    Smokeless tobacco: Never   Substance Use Topics    Alcohol use: Yes       Family History   Problem Relation Name Age of Onset    Diabetes type I Mother      Heart disease Father      Stroke Maternal Grandmother      Breast cancer Maternal Grandmother      Lung disease Paternal Grandfather         Review of patient's allergies indicates:  No Known Allergies    Medication List with Changes/Refills   New Medications    HYDROXYCHLOROQUINE (PLAQUENIL) 200 MG TABLET    Take 1 tablet (200 mg total) by mouth 2 (two) times daily.    METHYLPREDNISOLONE (MEDROL DOSEPACK) 4 MG TABLET    use as  directed   Current Medications    ALPRAZOLAM (XANAX) 0.5 MG TABLET    Take 0.5 mg by mouth 2 (two) times daily. as needed for anxiety.    DEXTROAMPHETAMINE-AMPHETAMINE 30 MG TAB    Take 1 tablet by mouth 2 (two) times daily.    FLUOXETINE 20 MG CAPSULE    Take 60 mg by mouth once daily.    LEVETIRACETAM (KEPPRA) 500 MG TAB    Take 500 mg by mouth once daily.   Discontinued Medications    HYDROCODONE-ACETAMINOPHEN (NORCO) 5-325 MG PER TABLET    Take 1 tablet by mouth every 6 (six) hours as needed.    NAPROXEN (NAPROSYN) 500 MG TABLET    Take 1 tablet (500 mg total) by mouth 2 (two) times daily with meals.         Thank you for allowing me to participate in the care ofAndrea Ordoñez.    Disclaimer: This note was prepared using voice recognition system and is likely to have sound alike errors and is not proof read.  Please call me with any questions.        Total time spent 60 minutes. This includes face to face time and non-face to face time preparing to see the patient (eg, review of tests), obtaining and/or reviewing separately obtained history, documenting clinical information in the electronic or other health record, independently interpreting results and communicating results to the patient/family/caregiver, or care coordinator.

## 2024-08-21 NOTE — PROGRESS NOTES
Inflammation level-C-reactive protein shows significant improvement compared to the one drawn at emergency room last time.  Start medication as advised.  Dr. ROCHA

## 2024-08-21 NOTE — PATIENT INSTRUCTIONS
Read Fiber Fueled, Why we sleep  and The Immune System Recover Plan ( books ) .   Dr. Andrew Weil's anti-inflammatory diet.   Take Arthritis strength extended release Tylenol 650 mg 1 tablet 3 times daily as needed for pain.  Start Turmeric supplements 1000 mg 2 times daily for anti-inflammatory benefit.

## 2024-08-22 ENCOUNTER — PATIENT MESSAGE (OUTPATIENT)
Dept: RHEUMATOLOGY | Facility: CLINIC | Age: 39
End: 2024-08-22
Payer: COMMERCIAL

## 2024-08-22 LAB — ANA SER QL IF: NORMAL

## 2024-08-29 ENCOUNTER — PATIENT MESSAGE (OUTPATIENT)
Dept: RHEUMATOLOGY | Facility: CLINIC | Age: 39
End: 2024-08-29
Payer: COMMERCIAL

## 2024-12-10 ENCOUNTER — OFFICE VISIT (OUTPATIENT)
Dept: RHEUMATOLOGY | Facility: CLINIC | Age: 39
End: 2024-12-10
Payer: COMMERCIAL

## 2024-12-10 ENCOUNTER — LAB VISIT (OUTPATIENT)
Dept: LAB | Facility: HOSPITAL | Age: 39
End: 2024-12-10
Attending: INTERNAL MEDICINE
Payer: COMMERCIAL

## 2024-12-10 VITALS
WEIGHT: 155.63 LBS | HEIGHT: 62 IN | BODY MASS INDEX: 28.64 KG/M2 | HEART RATE: 98 BPM | DIASTOLIC BLOOD PRESSURE: 74 MMHG | SYSTOLIC BLOOD PRESSURE: 121 MMHG

## 2024-12-10 DIAGNOSIS — M12.30 PALINDROMIC RHEUMATISM: Primary | ICD-10-CM

## 2024-12-10 DIAGNOSIS — Z79.899 LONG-TERM USE OF PLAQUENIL: ICD-10-CM

## 2024-12-10 DIAGNOSIS — M13.0 POLYARTHRITIS: ICD-10-CM

## 2024-12-10 DIAGNOSIS — M25.50 HYPERMOBILITY ARTHRALGIA: ICD-10-CM

## 2024-12-10 DIAGNOSIS — M12.30 PALINDROMIC RHEUMATISM: ICD-10-CM

## 2024-12-10 LAB
ALBUMIN SERPL BCP-MCNC: 4.5 G/DL (ref 3.5–5.2)
ALP SERPL-CCNC: 70 U/L (ref 40–150)
ALT SERPL W/O P-5'-P-CCNC: 17 U/L (ref 10–44)
ANION GAP SERPL CALC-SCNC: 11 MMOL/L (ref 8–16)
AST SERPL-CCNC: 15 U/L (ref 10–40)
BASOPHILS # BLD AUTO: 0.06 K/UL (ref 0–0.2)
BASOPHILS NFR BLD: 0.9 % (ref 0–1.9)
BILIRUB SERPL-MCNC: 0.5 MG/DL (ref 0.1–1)
BUN SERPL-MCNC: 11 MG/DL (ref 6–20)
CALCIUM SERPL-MCNC: 9.4 MG/DL (ref 8.7–10.5)
CHLORIDE SERPL-SCNC: 105 MMOL/L (ref 95–110)
CO2 SERPL-SCNC: 24 MMOL/L (ref 23–29)
CREAT SERPL-MCNC: 0.8 MG/DL (ref 0.5–1.4)
CRP SERPL-MCNC: 0.7 MG/L (ref 0–8.2)
DIFFERENTIAL METHOD BLD: ABNORMAL
EOSINOPHIL # BLD AUTO: 0.2 K/UL (ref 0–0.5)
EOSINOPHIL NFR BLD: 3.4 % (ref 0–8)
ERYTHROCYTE [DISTWIDTH] IN BLOOD BY AUTOMATED COUNT: 12.4 % (ref 11.5–14.5)
ERYTHROCYTE [SEDIMENTATION RATE] IN BLOOD BY WESTERGREN METHOD: 2 MM/HR (ref 0–36)
EST. GFR  (NO RACE VARIABLE): >60 ML/MIN/1.73 M^2
GLUCOSE SERPL-MCNC: 96 MG/DL (ref 70–110)
HCT VFR BLD AUTO: 41.2 % (ref 37–48.5)
HGB BLD-MCNC: 13.8 G/DL (ref 12–16)
IMM GRANULOCYTES # BLD AUTO: 0.01 K/UL (ref 0–0.04)
IMM GRANULOCYTES NFR BLD AUTO: 0.2 % (ref 0–0.5)
LYMPHOCYTES # BLD AUTO: 2.5 K/UL (ref 1–4.8)
LYMPHOCYTES NFR BLD: 37.6 % (ref 18–48)
MCH RBC QN AUTO: 31.4 PG (ref 27–31)
MCHC RBC AUTO-ENTMCNC: 33.5 G/DL (ref 32–36)
MCV RBC AUTO: 94 FL (ref 82–98)
MONOCYTES # BLD AUTO: 0.3 K/UL (ref 0.3–1)
MONOCYTES NFR BLD: 5.2 % (ref 4–15)
NEUTROPHILS # BLD AUTO: 3.5 K/UL (ref 1.8–7.7)
NEUTROPHILS NFR BLD: 52.7 % (ref 38–73)
NRBC BLD-RTO: 0 /100 WBC
PLATELET # BLD AUTO: 297 K/UL (ref 150–450)
PMV BLD AUTO: 9.2 FL (ref 9.2–12.9)
POTASSIUM SERPL-SCNC: 4.1 MMOL/L (ref 3.5–5.1)
PROT SERPL-MCNC: 7.1 G/DL (ref 6–8.4)
RBC # BLD AUTO: 4.39 M/UL (ref 4–5.4)
SODIUM SERPL-SCNC: 140 MMOL/L (ref 136–145)
WBC # BLD AUTO: 6.54 K/UL (ref 3.9–12.7)

## 2024-12-10 PROCEDURE — 99999 PR PBB SHADOW E&M-EST. PATIENT-LVL IV: CPT | Mod: PBBFAC,,, | Performed by: INTERNAL MEDICINE

## 2024-12-10 PROCEDURE — 3078F DIAST BP <80 MM HG: CPT | Mod: CPTII,S$GLB,, | Performed by: INTERNAL MEDICINE

## 2024-12-10 PROCEDURE — 3008F BODY MASS INDEX DOCD: CPT | Mod: CPTII,S$GLB,, | Performed by: INTERNAL MEDICINE

## 2024-12-10 PROCEDURE — 99215 OFFICE O/P EST HI 40 MIN: CPT | Mod: 25,S$GLB,, | Performed by: INTERNAL MEDICINE

## 2024-12-10 PROCEDURE — 96372 THER/PROPH/DIAG INJ SC/IM: CPT | Mod: S$GLB,,, | Performed by: INTERNAL MEDICINE

## 2024-12-10 PROCEDURE — 80053 COMPREHEN METABOLIC PANEL: CPT | Performed by: INTERNAL MEDICINE

## 2024-12-10 PROCEDURE — 85025 COMPLETE CBC W/AUTO DIFF WBC: CPT | Performed by: INTERNAL MEDICINE

## 2024-12-10 PROCEDURE — 85651 RBC SED RATE NONAUTOMATED: CPT | Performed by: INTERNAL MEDICINE

## 2024-12-10 PROCEDURE — 3074F SYST BP LT 130 MM HG: CPT | Mod: CPTII,S$GLB,, | Performed by: INTERNAL MEDICINE

## 2024-12-10 PROCEDURE — 86140 C-REACTIVE PROTEIN: CPT | Performed by: INTERNAL MEDICINE

## 2024-12-10 PROCEDURE — 36415 COLL VENOUS BLD VENIPUNCTURE: CPT | Performed by: INTERNAL MEDICINE

## 2024-12-10 PROCEDURE — 1159F MED LIST DOCD IN RCRD: CPT | Mod: CPTII,S$GLB,, | Performed by: INTERNAL MEDICINE

## 2024-12-10 RX ORDER — SULFASALAZINE 500 MG/1
500 TABLET, DELAYED RELEASE ORAL 2 TIMES DAILY
Qty: 60 TABLET | Refills: 0 | Status: SHIPPED | OUTPATIENT
Start: 2024-12-10 | End: 2024-12-10

## 2024-12-10 RX ORDER — BETAMETHASONE SODIUM PHOSPHATE AND BETAMETHASONE ACETATE 3; 3 MG/ML; MG/ML
6 INJECTION, SUSPENSION INTRA-ARTICULAR; INTRALESIONAL; INTRAMUSCULAR; SOFT TISSUE
Status: COMPLETED | OUTPATIENT
Start: 2024-12-10 | End: 2024-12-10

## 2024-12-10 RX ADMIN — BETAMETHASONE SODIUM PHOSPHATE AND BETAMETHASONE ACETATE 6 MG: 3; 3 INJECTION, SUSPENSION INTRA-ARTICULAR; INTRALESIONAL; INTRAMUSCULAR; SOFT TISSUE at 02:12

## 2024-12-10 NOTE — PROGRESS NOTES
Administered 1 cc Betamethasone 6mg/cc  to right ventrogluteal. Pt tolerated well. No acute reaction noted to site. Pt instructed on S/S to report. Advised patient to wait in lobby 15 minutes after receiving injection to monitor for any reactions. Pt verbalized understanding.     Lot: [id number]   Exp: 05/22/2025              Side Effects:  appetite changes, glucose intolerance, insomnia, diaphoresis (sweating), elevated blood pressure, ecchymosis (bruising), rash, headache, injection site reaction/pain, anaphylaxis.

## 2024-12-10 NOTE — PROGRESS NOTES
RHEUMATOLOGY CLINIC FOLLOW UP VISIT  Chief complaints, HPI, ROS, EXAM, Assessment & Plans:-  Andrea Jang a 39 y.o. pleasant female comes in for follow-up.  She follows in Clinic for-palindromic left-sided rheumatoid arthritis and complicated by hypermobility arthralgia on Plaquenil.  Denies any improvement on neither in the intensity or frequency of flares.  No significant other new symptoms.  Severe left-sided wrist, elbow, shoulder and ankle pain today.  Present flare has been occurring for the past couple of days.  Pain associated with significant stiffness but no significant swelling compared to prior episodes.  Exam shows significant tenderness of left wrists, elbow and shoulder without significant synovitis or effusion of all joints.  Mild redness of left elbow present.  No tenderness of right-sided joints..    1. Palindromic rheumatism    2. Hypermobility arthralgia    3. Long-term use of Plaquenil      Problem List Items Addressed This Visit       Hypermobility arthralgia    Long-term use of Plaquenil    Palindromic rheumatism - Primary    Relevant Medications    betamethasone acetate-betamethasone sodium phosphate injection 6 mg (Completed)    Other Relevant Orders    NM Joint Scan Whole Body       Labs reviewed today:-   Latest Reference Range & Units Most Recent   ANNE Screen Negative <1:80  Negative <1:80  8/21/24 14:20   CCP Antibodies <5.0 U/mL <0.5  8/21/24 14:20   Source  Endocervical Swab  1/26/22 13:41   Rheumatoid Factor 0.0 - 15.0 IU/mL <13.0  8/21/24 14:20         Undifferentiated inflammatory arthritis with of asymmetric oligoarticular presentation initially presenting as palindromic rheumatism.  On Plaquenil.  IM Celestone today.  Check nuclear medicine joint scan for any evidence of inflammatory arthritis.  Consider starting sulfasalazine after nuclear scan.  Consult  for hypermobility syndrome.  ?  Traumatic enthesitis  contributing to inflammatory arthritis-secondary to hypermobility disorder.   I have explained all of the above in detail and the patient understands all of the current recommendation(s). I have answered all questions to the best of my ability and to their complete satisfaction.       # Follow up in about 3 months (around 3/10/2025).      Disclaimer: This note was prepared using voice recognition system and is likely to have sound alike errors and is not proof read.  Please call me with any questions.